# Patient Record
Sex: MALE | Race: WHITE | Employment: STUDENT | URBAN - METROPOLITAN AREA
[De-identification: names, ages, dates, MRNs, and addresses within clinical notes are randomized per-mention and may not be internally consistent; named-entity substitution may affect disease eponyms.]

---
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
## 2019-12-21 ENCOUNTER — HOSPITAL ENCOUNTER (EMERGENCY)
Facility: CLINIC | Age: 19
Discharge: HOME | End: 2019-12-21
Attending: EMERGENCY MEDICINE | Admitting: EMERGENCY MEDICINE
Payer: COMMERCIAL

## 2019-12-21 ENCOUNTER — APPOINTMENT (OUTPATIENT)
Dept: CT IMAGING | Facility: CLINIC | Age: 19
End: 2019-12-21
Attending: EMERGENCY MEDICINE
Payer: COMMERCIAL

## 2019-12-21 VITALS
OXYGEN SATURATION: 97 % | HEART RATE: 89 BPM | RESPIRATION RATE: 16 BRPM | BODY MASS INDEX: 23.71 KG/M2 | WEIGHT: 170 LBS | DIASTOLIC BLOOD PRESSURE: 52 MMHG | TEMPERATURE: 99.5 F | SYSTOLIC BLOOD PRESSURE: 90 MMHG

## 2019-12-21 DIAGNOSIS — R11.2 NON-INTRACTABLE VOMITING WITH NAUSEA, UNSPECIFIED VOMITING TYPE: ICD-10-CM

## 2019-12-21 DIAGNOSIS — R10.84 ABDOMINAL PAIN, GENERALIZED: ICD-10-CM

## 2019-12-21 LAB
ANION GAP SERPL CALCULATED.3IONS-SCNC: 5 MMOL/L (ref 3–14)
BASOPHILS # BLD AUTO: 0.1 10E9/L (ref 0–0.2)
BASOPHILS NFR BLD AUTO: 0.3 %
BUN SERPL-MCNC: 12 MG/DL (ref 7–30)
CALCIUM SERPL-MCNC: 8.8 MG/DL (ref 8.5–10.1)
CHLORIDE SERPL-SCNC: 105 MMOL/L (ref 94–109)
CO2 SERPL-SCNC: 29 MMOL/L (ref 20–32)
CREAT SERPL-MCNC: 0.98 MG/DL (ref 0.66–1.25)
DIFFERENTIAL METHOD BLD: ABNORMAL
EOSINOPHIL # BLD AUTO: 0.1 10E9/L (ref 0–0.7)
EOSINOPHIL NFR BLD AUTO: 0.3 %
ERYTHROCYTE [DISTWIDTH] IN BLOOD BY AUTOMATED COUNT: 11.7 % (ref 10–15)
GFR SERPL CREATININE-BSD FRML MDRD: >90 ML/MIN/{1.73_M2}
GLUCOSE SERPL-MCNC: 125 MG/DL (ref 70–99)
HCT VFR BLD AUTO: 47.1 % (ref 40–53)
HGB BLD-MCNC: 16.1 G/DL (ref 13.3–17.7)
IMM GRANULOCYTES # BLD: 0.1 10E9/L (ref 0–0.4)
IMM GRANULOCYTES NFR BLD: 0.4 %
LYMPHOCYTES # BLD AUTO: 0.3 10E9/L (ref 0.8–5.3)
LYMPHOCYTES NFR BLD AUTO: 1.5 %
MCH RBC QN AUTO: 29.3 PG (ref 26.5–33)
MCHC RBC AUTO-ENTMCNC: 34.2 G/DL (ref 31.5–36.5)
MCV RBC AUTO: 86 FL (ref 78–100)
MONOCYTES # BLD AUTO: 0.9 10E9/L (ref 0–1.3)
MONOCYTES NFR BLD AUTO: 4.8 %
NEUTROPHILS # BLD AUTO: 17 10E9/L (ref 1.6–8.3)
NEUTROPHILS NFR BLD AUTO: 92.7 %
NRBC # BLD AUTO: 0 10*3/UL
NRBC BLD AUTO-RTO: 0 /100
PLATELET # BLD AUTO: 279 10E9/L (ref 150–450)
POTASSIUM SERPL-SCNC: 3.9 MMOL/L (ref 3.4–5.3)
RBC # BLD AUTO: 5.5 10E12/L (ref 4.4–5.9)
SODIUM SERPL-SCNC: 139 MMOL/L (ref 133–144)
WBC # BLD AUTO: 18.4 10E9/L (ref 4–11)

## 2019-12-21 PROCEDURE — 99284 EMERGENCY DEPT VISIT MOD MDM: CPT | Mod: Z6 | Performed by: EMERGENCY MEDICINE

## 2019-12-21 PROCEDURE — 74177 CT ABD & PELVIS W/CONTRAST: CPT

## 2019-12-21 PROCEDURE — 25000128 H RX IP 250 OP 636: Performed by: EMERGENCY MEDICINE

## 2019-12-21 PROCEDURE — 80048 BASIC METABOLIC PNL TOTAL CA: CPT | Performed by: EMERGENCY MEDICINE

## 2019-12-21 PROCEDURE — 85025 COMPLETE CBC W/AUTO DIFF WBC: CPT | Performed by: EMERGENCY MEDICINE

## 2019-12-21 PROCEDURE — 99285 EMERGENCY DEPT VISIT HI MDM: CPT | Mod: 25 | Performed by: EMERGENCY MEDICINE

## 2019-12-21 PROCEDURE — 96374 THER/PROPH/DIAG INJ IV PUSH: CPT | Mod: 59 | Performed by: EMERGENCY MEDICINE

## 2019-12-21 PROCEDURE — 25000125 ZZHC RX 250: Performed by: EMERGENCY MEDICINE

## 2019-12-21 PROCEDURE — 96376 TX/PRO/DX INJ SAME DRUG ADON: CPT | Performed by: EMERGENCY MEDICINE

## 2019-12-21 RX ORDER — IOPAMIDOL 755 MG/ML
83 INJECTION, SOLUTION INTRAVASCULAR ONCE
Status: COMPLETED | OUTPATIENT
Start: 2019-12-21 | End: 2019-12-21

## 2019-12-21 RX ORDER — ONDANSETRON 2 MG/ML
4 INJECTION INTRAMUSCULAR; INTRAVENOUS EVERY 30 MIN PRN
Status: DISCONTINUED | OUTPATIENT
Start: 2019-12-21 | End: 2019-12-21 | Stop reason: HOSPADM

## 2019-12-21 RX ORDER — ONDANSETRON 4 MG/1
4 TABLET, ORALLY DISINTEGRATING ORAL EVERY 8 HOURS PRN
Qty: 12 TABLET | Refills: 0 | Status: SHIPPED | OUTPATIENT
Start: 2019-12-21

## 2019-12-21 RX ADMIN — IOPAMIDOL 83 ML: 755 INJECTION, SOLUTION INTRAVENOUS at 06:09

## 2019-12-21 RX ADMIN — SODIUM CHLORIDE 61 ML: 9 INJECTION, SOLUTION INTRAVENOUS at 06:09

## 2019-12-21 RX ADMIN — ONDANSETRON 4 MG: 2 INJECTION INTRAMUSCULAR; INTRAVENOUS at 05:05

## 2019-12-21 RX ADMIN — ONDANSETRON 4 MG: 2 INJECTION INTRAMUSCULAR; INTRAVENOUS at 08:02

## 2019-12-21 NOTE — ED TRIAGE NOTES
Sudden onset abd pain while skiing  No fall or injury  Was fine prior to pain starting  Emesis on [place] 911 [place] to [place]  No emesis for EMS pt declined IV or IM meds reported nausea was improving and pain was better but not gone    EMS was unsuccessful contacting family     Arrives in no distress ambulatory to the bathroom independently without difficulty

## 2019-12-21 NOTE — ED AVS SNAPSHOT
[facility] Emergency Department  [address] [address]  Phone:  [phone]  Fax:  [phone]                                    Andrey Blank   MRN: [id number]    Department:  [facility] Emergency Department   Date of Visit:  12/21/2019           After Visit Summary Signature Page    I have received my discharge instructions, and my questions have been answered. I have discussed any challenges I see with this plan with the nurse or doctor.    ..........................................................................................................................................  Patient/Patient Representative Signature      ..........................................................................................................................................  Patient Representative Print Name and Relationship to Patient    ..................................................               ................................................  Date                                   Time    ..........................................................................................................................................  Reviewed by Signature/Title    ...................................................              ..............................................  Date                                               Time          22EPIC Rev 08/18

## 2019-12-21 NOTE — DISCHARGE INSTRUCTIONS
You may take ondanstron (Zofran) every  8 hours as needed for nausea. See your primary care physician in 2-3 days if your symptoms do not completely resolve. If your pain worsens, you develop a fever, have uncontrollable vomiting, or develop new or concerning symptoms, please return to the Emergency Department for further evaluation and treatment.

## 2019-12-27 ASSESSMENT — ENCOUNTER SYMPTOMS
NECK STIFFNESS: 0
DIARRHEA: 0
ABDOMINAL PAIN: 1
NECK PAIN: 0
WOUND: 0
DIZZINESS: 0
CHILLS: 0
VOMITING: 1
COUGH: 0
NAUSEA: 1
DIFFICULTY URINATING: 0
SHORTNESS OF BREATH: 0
FEVER: 0
SORE THROAT: 0

## 2019-12-28 NOTE — ED PROVIDER NOTES
History     Chief Complaint   Patient presents with     Abdominal Pain     HPI  Andrey Blank is a 19 year old male with no significant past medical history who presents with acute onset abdominal pain associate with nausea and vomiting.  Patient was in his usual state of health earlier in the day and was skiing when pain began.  Pains described as severe, sharp, nonradiating, no known provocative or palliating features, and no reported traumatic injury.  Multiple episodes of emesis at [facility] and EMS called.  Patient declined IV or IM medications and reported improvement of pain but not complete resolution.  Denies prior similar episodes.  No abdominal surgeries.  Not take any medications currently no known drug allergies.  Denies alcohol or tobacco use.  Smokes marijuana regularly.    The patient's PMHx, Surgical Hx, Allergies, and Medications were all reviewed with the patient.    Allergies:  Allergies   Allergen Reactions     Cat Hair Extract      Dogs      Pollen Extract        Problem List:    Patient Active Problem List    Diagnosis Date Noted     Acne vulgaris 05/26/2016     Priority: Medium     Blurred vision 08/19/2014     Priority: Medium     Dizziness 08/19/2014     Priority: Medium     Increased thirst 08/19/2014     Priority: Medium     Plantar warts 03/27/2014     Priority: Medium        Past Medical History:    Past Medical History:   Diagnosis Date     NO ACTIVE PROBLEMS        Past Surgical History:    Past Surgical History:   Procedure Laterality Date     TONSILLECTOMY & ADENOIDECTOMY  age of 6       Family History:    Family History   Problem Relation Age of Onset     C.A.D. Paternal Grandfather 62       Social History:  Marital Status:  Single [1]  Social History     Tobacco Use     Smoking status: Never Smoker     Smokeless tobacco: Never Used     Tobacco comment: uses e-cig   Substance Use Topics     Alcohol use: No     Drug use: Yes     Types: Marijuana     Comment: marijuana a few  times per week for anxiety        Medications:    ondansetron (ZOFRAN-ODT) 4 MG ODT tab  FLUoxetine HCl (PROZAC PO)  hydrOXYzine (ATARAX) 25 MG tablet          Review of Systems   Constitutional: Negative for chills and fever.   HENT: Negative for congestion and sore throat.    Eyes: Negative for visual disturbance.   Respiratory: Negative for cough and shortness of breath.    Cardiovascular: Negative for chest pain.   Gastrointestinal: Positive for abdominal pain, nausea and vomiting. Negative for diarrhea.   Genitourinary: Negative for difficulty urinating and testicular pain.   Musculoskeletal: Negative for neck pain and neck stiffness.   Skin: Negative for rash and wound.   Neurological: Negative for dizziness.       Physical Exam   BP: 121/57  Pulse: 100  Heart Rate: 86  Temp: 98.9  F (37.2  C)  Resp: 18  Weight: 77.1 kg (170 lb)  SpO2: 99 %    Physical Exam  GEN: Awake, alert, and cooperative.  Patient appears mildly distressed but nontoxic sleeping on cart  HENT: MMM. External ears and nose normal bilaterally.  Atraumatic  EYES: EOM intact. Conjunctiva clear. PEERL. No discharge.   NECK: Supple, symmetric.  Nontender  CV : Regular rate and rhythm  PULM: Normal effort. No wheezes, rales, or rhonchi bilaterally.  CHEST: no tenderness with AP or lateral compression. No ecchymosis or signs of trauma.   ABD: Soft, non-tender, non-distended. No rebound or guarding. No ecchymosis or signs of trauma.  BACK: no midline spinal tenderness, no CVA tenderness, no signs of trauma.   NEURO: Normal speech. Following commands. CN II-XII grossly intact. Answering questions and interacting appropriately.   EXT: No gross deformity. Warm and well perfused  INT: Warm. No diaphoresis. Normal color.        ED Course        Procedures           Critical Care time:  none               No results found for this or any previous visit (from the past 24 hour(s)).    Medications   iopamidol (ISOVUE-370) solution 83 mL (83 mLs Intravenous  Given 12/21/19 0609)   sodium chloride 0.9 % bag 500mL for CT scan flush use (61 mLs Intravenous Given 12/21/19 0609)     CT Abdomen Pelvis w Contrast   Final Result   IMPRESSION:    1.  Normal abdomen and pelvis CT. No acute abnormalities or CT findings to explain abdominal pain.               Assessments & Plan (with Medical Decision Making)   19 year old male with no significant past medical history who presents with acute onset abdominal pain associated with nausea and vomiting while skiing.  Denies any trauma.  On arrival to Emergency Department, vital signs were within normal limits.  Exam with benign abdomen, no signs of trauma.  Labs obtained and notable for leukocytosis of 18,000, otherwise unremarkable.  CT scan of abdomen and pelvis reviewed by myself as well as read from radiology without any evidence of acute intradermal pathology to explain symptoms.  Patient was tolerating p.o. and was given 4 mg IV ondansetron x2.  Repeat abdominal exam benign.  After observation in the emergency department he remained hemodynamically stable and was able to tolerate p.o.  Patient denies drinking today but does smoke marijuana regularly.  Given no fever, chills or other infectious symptoms I feel the leukocytosis would most likely be due to stress demargination.  Discussed results with patient as well as mother and sister who are at bedside.  Patient feels well enough to be discharged to home and states he would not of come to the emergency department if the ambulance was not called by the [place].  Plan for close primary care follow-up, ODT ondansetron as needed for nausea.  Strict ED return precautions discussed.  Patient expresses agreement understanding of plan and was discharged in improved condition.    I have reviewed the nursing notes.         Discharge Medication List as of 12/21/2019  8:52 AM      START taking these medications    Details   ondansetron (ZOFRAN-ODT) 4 MG ODT tab Take 1 tablet (4 mg) by mouth  every 8 hours as needed for nausea, Disp-12 tablet, R-0, E-Prescribe             Final diagnoses:   Abdominal pain, generalized   Non-intractable vomiting with nausea, unspecified vomiting type     Red Feliciano MD    12/21/2019   [facility] EMERGENCY DEPARTMENT    Disclaimer: This note consists of words and symbols derived from keyboarding and dictation using voice recognition software.  As a result, there may be errors that have gone undetected.  Please consider this when interpreting information found in this note.     Red Feliciano MD  12/27/19 2014

## 2020-03-01 ENCOUNTER — HEALTH MAINTENANCE LETTER (OUTPATIENT)
Age: 20
End: 2020-03-01

## 2020-05-29 ENCOUNTER — TRANSFERRED RECORDS (OUTPATIENT)
Dept: HEALTH INFORMATION MANAGEMENT | Facility: CLINIC | Age: 20
End: 2020-05-29

## 2020-10-01 ENCOUNTER — TRANSFERRED RECORDS (OUTPATIENT)
Dept: HEALTH INFORMATION MANAGEMENT | Facility: CLINIC | Age: 20
End: 2020-10-01

## 2020-12-14 ENCOUNTER — HEALTH MAINTENANCE LETTER (OUTPATIENT)
Age: 20
End: 2020-12-14

## 2021-04-17 ENCOUNTER — HEALTH MAINTENANCE LETTER (OUTPATIENT)
Age: 21
End: 2021-04-17

## 2021-10-02 ENCOUNTER — HEALTH MAINTENANCE LETTER (OUTPATIENT)
Age: 21
End: 2021-10-02

## 2021-11-09 ENCOUNTER — TRANSFERRED RECORDS (OUTPATIENT)
Dept: HEALTH INFORMATION MANAGEMENT | Facility: CLINIC | Age: 21
End: 2021-11-09
Payer: COMMERCIAL

## 2021-11-09 LAB — PHQ9 SCORE: 2

## 2022-01-20 ENCOUNTER — TRANSFERRED RECORDS (OUTPATIENT)
Dept: HEALTH INFORMATION MANAGEMENT | Facility: CLINIC | Age: 22
End: 2022-01-20
Payer: COMMERCIAL

## 2022-05-14 ENCOUNTER — HEALTH MAINTENANCE LETTER (OUTPATIENT)
Age: 22
End: 2022-05-14

## 2022-09-03 ENCOUNTER — HEALTH MAINTENANCE LETTER (OUTPATIENT)
Age: 22
End: 2022-09-03

## 2023-06-02 ENCOUNTER — HEALTH MAINTENANCE LETTER (OUTPATIENT)
Age: 23
End: 2023-06-02

## 2025-09-01 ENCOUNTER — HOSPITAL ENCOUNTER (EMERGENCY)
Facility: CLINIC | Age: 25
Discharge: HOME | End: 2025-09-01
Attending: NURSE PRACTITIONER | Admitting: NURSE PRACTITIONER
Payer: COMMERCIAL

## 2025-09-01 VITALS
HEART RATE: 82 BPM | SYSTOLIC BLOOD PRESSURE: 108 MMHG | TEMPERATURE: 97.5 F | DIASTOLIC BLOOD PRESSURE: 74 MMHG | OXYGEN SATURATION: 96 %

## 2025-09-01 DIAGNOSIS — K64.4 EXTERNAL HEMORRHOIDS: Primary | ICD-10-CM

## 2025-09-01 PROCEDURE — G0463 HOSPITAL OUTPT CLINIC VISIT: HCPCS | Performed by: NURSE PRACTITIONER

## 2025-09-01 PROCEDURE — 99213 OFFICE O/P EST LOW 20 MIN: CPT | Performed by: NURSE PRACTITIONER

## 2025-09-01 RX ORDER — HYDROCORTISONE 25 MG/G
CREAM TOPICAL 2 TIMES DAILY PRN
Qty: 30 G | Refills: 0 | Status: SHIPPED | OUTPATIENT
Start: 2025-09-01 | End: 2025-09-08